# Patient Record
Sex: FEMALE | Race: WHITE | NOT HISPANIC OR LATINO | Employment: FULL TIME | URBAN - METROPOLITAN AREA
[De-identification: names, ages, dates, MRNs, and addresses within clinical notes are randomized per-mention and may not be internally consistent; named-entity substitution may affect disease eponyms.]

---

## 2023-05-30 ENCOUNTER — TELEPHONE (OUTPATIENT)
Dept: GENETICS | Facility: CLINIC | Age: 43
End: 2023-05-30

## 2023-05-30 NOTE — TELEPHONE ENCOUNTER
LVM informing pt that the appt that is scheduled for genetics is incorrect. Informed pt that she needs to be scheduled with hematology. Gave pt number to [facility] location. Requested that pt contact the office to confirm that message has been received.

## 2023-05-31 ENCOUNTER — TELEPHONE (OUTPATIENT)
Dept: GENETICS | Facility: CLINIC | Age: 43
End: 2023-05-31

## 2023-05-31 NOTE — TELEPHONE ENCOUNTER
----- Message from Sanam Gonsales MA sent at 5/30/2023  2:29 PM CDT -----  Contact: Claire [phone]    ----- Message -----  From: Elenita Cabrera  Sent: 5/30/2023  12:39 PM CDT  To: [facility] Staff    Returning a phone call.    Who left a message for the patient: Fidel Vaca MA     Do they know what this is regarding:  yes    Would they like a phone call back or a response via [facility]:   call back       Pt states it is not hematology it is genetic that she needs to see.Please call pt back for advice.

## 2023-05-31 NOTE — TELEPHONE ENCOUNTER
Called and spoke to pt, informed her that she need to be seen by hematology instead of genetics. Pt informed that she has seen hematology and they are the ones that referred her to Genetics. Pt said she was referred by [facility] for Bleeding disorders. Should we contact them to double check the reasoning for the referral. I don't see anything in her chart.

## 2023-06-01 NOTE — TELEPHONE ENCOUNTER
Called and spoke to Karla with [facility]. She informed that the pt is being referred to Genetics. She informed pt was referred for mutation of gene. She informed that she is faxing over the referral now.

## 2023-06-05 ENCOUNTER — TELEPHONE (OUTPATIENT)
Dept: PRIMARY CARE CLINIC | Facility: CLINIC | Age: 43
End: 2023-06-05

## 2023-06-05 ENCOUNTER — TELEPHONE (OUTPATIENT)
Dept: GENETICS | Facility: CLINIC | Age: 43
End: 2023-06-05

## 2023-06-05 NOTE — TELEPHONE ENCOUNTER
----- Message from Sin Whyte sent at 6/5/2023  2:15 PM CDT -----  Contact: [phone] @ patient  Good afternoon patient would like a call back to get an genetics appt. Please give patient a call back [phone]

## 2023-06-05 NOTE — TELEPHONE ENCOUNTER
----- Message from Sin Whyte sent at 6/5/2023  2:15 PM CDT -----  Contact: [phone] @ patient  Good afternoon patient would like a call back to get an genetics appt. Please give patient a call back [phone]